# Patient Record
Sex: FEMALE | URBAN - METROPOLITAN AREA
[De-identification: names, ages, dates, MRNs, and addresses within clinical notes are randomized per-mention and may not be internally consistent; named-entity substitution may affect disease eponyms.]

---

## 2024-01-17 ENCOUNTER — APPOINTMENT (OUTPATIENT)
Dept: URBAN - METROPOLITAN AREA CLINIC 206 | Age: 54
Setting detail: DERMATOLOGY
End: 2024-01-19

## 2024-01-17 DIAGNOSIS — L70.0 ACNE VULGARIS: ICD-10-CM

## 2024-01-17 DIAGNOSIS — L81.0 POSTINFLAMMATORY HYPERPIGMENTATION: ICD-10-CM

## 2024-01-17 PROCEDURE — OTHER PATIENT SPECIFIC COUNSELING: OTHER

## 2024-01-17 PROCEDURE — OTHER PRESCRIPTION: OTHER

## 2024-01-17 PROCEDURE — OTHER MIPS QUALITY: OTHER

## 2024-01-17 PROCEDURE — OTHER COUNSELING: OTHER

## 2024-01-17 PROCEDURE — 99204 OFFICE O/P NEW MOD 45 MIN: CPT

## 2024-01-17 PROCEDURE — OTHER PRESCRIPTION MEDICATION MANAGEMENT: OTHER

## 2024-01-17 RX ORDER — TRETIONIN 0.25 MG/G
CREAM TOPICAL
Qty: 45 | Refills: 3 | Status: ERX | COMMUNITY
Start: 2024-01-17

## 2024-01-17 NOTE — PROCEDURE: PRESCRIPTION MEDICATION MANAGEMENT
Initiate Treatment: Tretinoin 0.025% Cream
Render In Strict Bullet Format?: No
Plan: Cleansing Routine\\n-Tumeric Soap: Use once a day,\\n-Cetaphil Cleanser: Use during mid-day if needed\\n\\nDiscussed to consult with  for identifying the appropriate chemical peels of post-inflammatory hyperpigmentation\\n\\nRecommended skincare product: \\n-Hydrinity Kit (including Purifying Mist Anti-Microbial Mist)\\n\\nRecommended sunscreens for darker skin tones:\\n-EltaMD Clear SPF 46 (oil-free, zinc oxide) \\n-SkinBetter Sheer Sunscreen Stick SPF 56 (zinc oxide)\\n-Revision Multiprotection SPF 50 (moisturizing)\\n-ISDIN Eryfotona SPF 50 (oil-free, zinc oxide)\\n-ZO brush-on sunscreen in deep\\n\\nNot sold here:\\n-Colorescience No ShowMineral SPF 50\\n-Neutrogena UV Mineral Tint SPF 30 (tinted, zinc oxide)\\n-Olay Regenerist Hydrating Mineral SPF 30 (zinc oxide)\\n-Colorescience Sunforgettable Total Protection Face Shield Bronze SPF 50 (bronze tint, oil-free, zinc oxide)\\n-Murad Correct & Protect SPF 45 (zinc oxide serum sunscreen)\\n-Sente Even Tone Mineral SPF 36 (tinted, zinc oxide)\\n-Getachew Megashade SPF 50 (chemical sunscreen serum for acne prone skin)\\n-Skinceuticals Daily Brightening UV Defense Sunscreen SPF 30 (chemical sunscreen + Vitamin C)
Detail Level: Zone

## 2024-01-17 NOTE — HPI: PIMPLES (ACNE)
What Type Of Note Output Would You Prefer (Optional)?: Standard Output
How Severe Is Your Acne?: moderate
Is This A New Presentation, Or A Follow-Up?: Acne
Additional Comments (Use Complete Sentences): The acne breakout occurred a few days after she was hospitalized in June 2023. She was being treated for walking pneumonia, and admits to being treated with IV antibiotics and steroids.

## 2024-02-02 ENCOUNTER — APPOINTMENT (OUTPATIENT)
Dept: URBAN - METROPOLITAN AREA CLINIC 206 | Age: 54
Setting detail: DERMATOLOGY
End: 2024-02-05

## 2024-02-02 DIAGNOSIS — Z41.9 ENCOUNTER FOR PROCEDURE FOR PURPOSES OTHER THAN REMEDYING HEALTH STATE, UNSPECIFIED: ICD-10-CM

## 2024-02-02 PROCEDURE — OTHER COSMETIC CONSULTATION: CHEMICAL PEELS: OTHER

## 2024-02-02 ASSESSMENT — LOCATION SIMPLE DESCRIPTION DERM: LOCATION SIMPLE: INFERIOR FOREHEAD

## 2024-02-02 ASSESSMENT — LOCATION ZONE DERM: LOCATION ZONE: FACE

## 2024-02-02 ASSESSMENT — LOCATION DETAILED DESCRIPTION DERM: LOCATION DETAILED: INFERIOR MID FOREHEAD
